# Patient Record
Sex: MALE | Race: WHITE
[De-identification: names, ages, dates, MRNs, and addresses within clinical notes are randomized per-mention and may not be internally consistent; named-entity substitution may affect disease eponyms.]

---

## 2019-10-08 ENCOUNTER — HOSPITAL ENCOUNTER (OUTPATIENT)
Dept: HOSPITAL 74 - FASU | Age: 79
Discharge: HOME | End: 2019-10-08
Payer: COMMERCIAL

## 2019-10-08 VITALS — SYSTOLIC BLOOD PRESSURE: 140 MMHG | DIASTOLIC BLOOD PRESSURE: 71 MMHG | HEART RATE: 56 BPM | TEMPERATURE: 97.9 F

## 2019-10-08 VITALS — BODY MASS INDEX: 24.5 KG/M2

## 2019-10-08 DIAGNOSIS — H25.22: Primary | ICD-10-CM

## 2019-10-08 PROCEDURE — 08RK3JZ REPLACEMENT OF LEFT LENS WITH SYNTHETIC SUBSTITUTE, PERCUTANEOUS APPROACH: ICD-10-PCS

## 2019-10-08 RX ADMIN — OFLOXACIN ONE DROP: 3 SOLUTION/ DROPS OPHTHALMIC at 11:55

## 2019-10-08 RX ADMIN — CYCLOPENTOLATE HYDROCHLORIDE ONE DROP: 10 SOLUTION/ DROPS OPHTHALMIC at 12:10

## 2019-10-08 RX ADMIN — KETOROLAC TROMETHAMINE ONE DROP: 5 SOLUTION OPHTHALMIC at 11:55

## 2019-10-08 RX ADMIN — PHENYLEPHRINE HYDROCHLORIDE ONE DROP: 0.25 SPRAY NASAL at 12:00

## 2019-10-08 RX ADMIN — KETOROLAC TROMETHAMINE ONE DROP: 5 SOLUTION OPHTHALMIC at 12:00

## 2019-10-08 RX ADMIN — CYCLOPENTOLATE HYDROCHLORIDE ONE DROP: 10 SOLUTION/ DROPS OPHTHALMIC at 12:00

## 2019-10-08 RX ADMIN — CYCLOPENTOLATE HYDROCHLORIDE ONE DROP: 10 SOLUTION/ DROPS OPHTHALMIC at 11:55

## 2019-10-08 RX ADMIN — TROPICAMIDE ONE DROP: 10 SOLUTION/ DROPS OPHTHALMIC at 12:00

## 2019-10-08 RX ADMIN — PHENYLEPHRINE HYDROCHLORIDE ONE DROP: 0.25 SPRAY NASAL at 12:05

## 2019-10-08 RX ADMIN — KETOROLAC TROMETHAMINE ONE DROP: 5 SOLUTION OPHTHALMIC at 12:10

## 2019-10-08 RX ADMIN — PHENYLEPHRINE HYDROCHLORIDE ONE DROP: 0.25 SPRAY NASAL at 12:10

## 2019-10-08 RX ADMIN — KETOROLAC TROMETHAMINE ONE DROP: 5 SOLUTION OPHTHALMIC at 12:05

## 2019-10-08 RX ADMIN — OFLOXACIN ONE DROP: 3 SOLUTION/ DROPS OPHTHALMIC at 12:05

## 2019-10-08 RX ADMIN — TROPICAMIDE ONE DROP: 10 SOLUTION/ DROPS OPHTHALMIC at 12:10

## 2019-10-08 RX ADMIN — OFLOXACIN ONE DROP: 3 SOLUTION/ DROPS OPHTHALMIC at 12:10

## 2019-10-08 RX ADMIN — TROPICAMIDE ONE DROP: 10 SOLUTION/ DROPS OPHTHALMIC at 12:05

## 2019-10-08 RX ADMIN — PHENYLEPHRINE HYDROCHLORIDE ONE DROP: 0.25 SPRAY NASAL at 11:55

## 2019-10-08 RX ADMIN — OFLOXACIN ONE DROP: 3 SOLUTION/ DROPS OPHTHALMIC at 12:00

## 2019-10-08 RX ADMIN — TROPICAMIDE ONE DROP: 10 SOLUTION/ DROPS OPHTHALMIC at 11:55

## 2019-10-08 RX ADMIN — CYCLOPENTOLATE HYDROCHLORIDE ONE DROP: 10 SOLUTION/ DROPS OPHTHALMIC at 12:05

## 2019-10-08 NOTE — OP
DATE OF OPERATION:  10/08/2019

 

TITLE OF PROCEDURE:  Planned extracapsular cataract extraction and

phacoemulsification of hypermature nucleus in the left eye with insertion of

posterior chamber lens implant.

 

SURGEON:  Jus Paulson MD

 

ASSISTANT:  Jus Paulson MD

 

ANESTHESIA:  Local with standby.

 

ANESTHESIOLOGIST:  AMITA Forte and then Talia Romero MD

 

COMPLICATIONS:  None.

 

PREOPERATIVE DIAGNOSIS:  Hypermature cataract, left eye.

 

POSTOPERATIVE DIAGNOSIS:  Hypermature cataract, left eye.

 

FINDINGS AND PROCEDURE:  After successful peribulbar anesthesia was given to the left

eye, the patient was prepped and draped in the usual manner to expose the left eye. 

Lid speculum was inserted after Tegaderm strips were placed and the operating room

microscope brought into position over the left eye.  A superior fornix-based flap was

fashioned for 12 mm using Mariam scissors and 0.12 forceps.  Hemostasis was

achieved with electrocautery.  Limbal groove was then fashioned for 3 mm with a

crescent blade and dissecting anterior into clear cornea.  Then, a 3-mm blade was

used to enter the anterior chamber _____ Viscoat.  A 360-degree anterior capsulotomy

was performed after Shugarcaine had been placed into the eye prior to the

instillation of Viscoat to maintain the anterior chamber.  Phacoemulsification of the

entire nucleus was then done in bimanual technique of this hypermature nucleus in

approximately 2-1/2 minutes' time, followed by irrigation and aspiration of all

cortical material, leaving intact posterior capsule and a red reflex present. 

Provisc was injected in the posterior chamber to deepen the posterior capsule.  The

implant was inspected carefully with the microscope and, found to be free of defects,

_____, or flaws.  It was irrigated, and then, it was folded, placed in the

Provisc-filled cartridge.  The cartridge was placed in the injector, and the implant

injected in the eye such that the inferior haptic was in the inferior capsular bar

and the superior haptic in the superior capsular bag and then rotated in a horizontal

position with the Sinskey hook.  The Provisc was aspirated out, replaced with Miochol

and Miostat and BSS.  The wound was closed with a single interrupted 10 Ethilon

suture and tested for leakage, and none was found.  The conjunctival Tenon's flap was

reapproximated.  At this point, the implant was fixated in the capsular bag,

centrally located, with a round pupil, intact posterior capsule, and a red reflex

present.  Topical Betoptic S and Maxitrol ophthalmic suspensions were placed as was

bacitracin/polymyxin B ophthalmic ointment.  Then, the Tegaderm strips and lid

speculum were removed from the eyelids.  The lids were closed.  A patch and shield

were placed on the eye.  The patient was then discharged from the operating room to

the recovery in good condition, having tolerated the procedure well.  At the end, it

should be noted that the red reflex was present, and the globe was intact at the end

of the procedure.

 

 

MOOKIE [id number]

DD: 10/08/2019 14:56

DT: 10/08/2019 22:00

Job #:  [id number]